# Patient Record
Sex: MALE | Race: WHITE | URBAN - METROPOLITAN AREA
[De-identification: names, ages, dates, MRNs, and addresses within clinical notes are randomized per-mention and may not be internally consistent; named-entity substitution may affect disease eponyms.]

---

## 2020-12-08 NOTE — PATIENT DISCUSSION
[place] grid at home. MVI/AREDS discussed. Patient to call if any changes in vision or grid card.
Cataract symptoms i.e., glare, blur discussed. Pt to call if worsening vision or trouble with driving, TV, reading, ADL. UV precautions. Reviewed possibility of future cataract surgery.
Discussed condition and exacerbating conditions/situations (e.g., dry/arid environments, overhead fans, air conditioners, side effect of medications).
Discussed lid hygiene, warm compress and eyelid wash.
Monitor cataract for progression.
Monitor.
No Retinal holes, Tears or Detachments.
Order fundus photo.
Patient understands condition, prognosis and need for follow up care.
Recommended artificial tears to use: 1 drop 2-4x a day in both eyes.
Retinal tear and detachment warning symptoms reviewed and patient instructed to call immediately if increasing floaters, flashes, or decreasing peripheral vision.
SRx, SV - DV,NV,IV.
unknown

## 2021-07-22 ENCOUNTER — NEW PATIENT COMPREHENSIVE (OUTPATIENT)
Dept: URBAN - METROPOLITAN AREA CLINIC 50 | Facility: CLINIC | Age: 71
End: 2021-07-22

## 2021-07-22 DIAGNOSIS — H43.813: ICD-10-CM

## 2021-07-22 DIAGNOSIS — H25.13: ICD-10-CM

## 2021-07-22 PROCEDURE — 92004 COMPRE OPH EXAM NEW PT 1/>: CPT

## 2021-07-22 PROCEDURE — 92015 DETERMINE REFRACTIVE STATE: CPT

## 2021-07-22 ASSESSMENT — VISUAL ACUITY
OS_CC: 20/30
OU_CC: J1+
OD_GLARE: 20/80
OD_SC: CF 4FT
OU_CC: 20/30
OD_GLARE: 20/40
OS_SC: 20/400
OD_CC: 20/30
OS_GLARE: 20/100
OS_GLARE: 20/60

## 2021-07-22 ASSESSMENT — KERATOMETRY
OS_AXISANGLE_DEGREES: 107
OS_K2POWER_DIOPTERS: 43.00
OD_K1POWER_DIOPTERS: 42.50
OS_AXISANGLE2_DEGREES: 17
OS_K1POWER_DIOPTERS: 42.25
OD_AXISANGLE2_DEGREES: 165
OD_AXISANGLE_DEGREES: 075
OD_K2POWER_DIOPTERS: 43.25

## 2021-07-22 ASSESSMENT — TONOMETRY
OS_IOP_MMHG: 13
OD_IOP_MMHG: 12

## 2021-08-05 ENCOUNTER — BIOMETRY (OUTPATIENT)
Dept: URBAN - METROPOLITAN AREA CLINIC 50 | Facility: CLINIC | Age: 71
End: 2021-08-05

## 2021-08-05 DIAGNOSIS — H25.13: ICD-10-CM

## 2021-08-05 PROCEDURE — TOPOIOL CORNEAL TOPOGRAPHY-PREMIUM IOL

## 2021-08-05 PROCEDURE — 92136 OPHTHALMIC BIOMETRY: CPT

## 2021-08-05 ASSESSMENT — KERATOMETRY
OS_K1POWER_DIOPTERS: 42.75
OS_AXISANGLE2_DEGREES: 098
OD_K2POWER_DIOPTERS: 41.87
OD_AXISANGLE2_DEGREES: 085
OD_AXISANGLE_DEGREES: 175
OS_AXISANGLE_DEGREES: 8
OD_K1POWER_DIOPTERS: 43.12
OS_K2POWER_DIOPTERS: 42.00

## 2021-08-25 ENCOUNTER — PRE OP - CE/IOL OS (OUTPATIENT)
Dept: URBAN - METROPOLITAN AREA CLINIC 50 | Facility: CLINIC | Age: 71
End: 2021-08-25

## 2021-08-25 DIAGNOSIS — H16.223: ICD-10-CM

## 2021-08-25 DIAGNOSIS — H43.811: ICD-10-CM

## 2021-08-25 DIAGNOSIS — H25.11: ICD-10-CM

## 2021-08-25 DIAGNOSIS — H25.12: ICD-10-CM

## 2021-08-25 PROCEDURE — PREOP PRE OP VISIT

## 2021-08-25 PROCEDURE — 92134 CPTRZ OPH DX IMG PST SGM RTA: CPT

## 2021-08-25 ASSESSMENT — VISUAL ACUITY
OS_CC: 20/40
OD_GLARE: 20/80
OS_GLARE: 20/100
OS_GLARE: 20/60
OD_CC: 20/25
OD_GLARE: 20/40

## 2021-08-25 ASSESSMENT — KERATOMETRY
OD_K2POWER_DIOPTERS: 41.87
OD_K1POWER_DIOPTERS: 43.12
OD_AXISANGLE2_DEGREES: 085
OS_K1POWER_DIOPTERS: 42.75
OD_AXISANGLE_DEGREES: 175
OS_AXISANGLE2_DEGREES: 098
OS_K2POWER_DIOPTERS: 42.00
OS_AXISANGLE_DEGREES: 8

## 2021-08-25 ASSESSMENT — TONOMETRY
OD_IOP_MMHG: 14
OS_IOP_MMHG: 15

## 2021-08-25 NOTE — PATIENT DISCUSSION
CATARACT SURGERY PLANNER - MULTIFOCAL IOL/+FEMTO: Phacoemulsification with IOL: Eye: LEFT|DOS: 9/14/21|Model: [id number]|Power: +13.00|Target: PLANO|Femto: YES|Arcs: - @ 5 ; -@ 185|Axis: 5|Visc: DUET|Omidria: YES|10% Phenylephrine: YES|Epi-shugarcaine: YES|Phaco Setting: DENSE|BSS+: NO|Trypan Blue: NO|CTR: NO|Olive Tip: NO|Atropine: NO|Pupilloplasty: NO|Notes: REQUEST MID MORNING. DENSE. AXIS 5. PLAN ORA.

## 2021-09-14 ENCOUNTER — SURGERY/PROCEDURE (OUTPATIENT)
Dept: URBAN - METROPOLITAN AREA SURGERY 16 | Facility: SURGERY | Age: 71
End: 2021-09-14

## 2021-09-14 ENCOUNTER — SAME DAY PO - CE/IOL (OUTPATIENT)
Dept: URBAN - METROPOLITAN AREA CLINIC 49 | Facility: CLINIC | Age: 71
End: 2021-09-14

## 2021-09-14 DIAGNOSIS — H25.12: ICD-10-CM

## 2021-09-14 DIAGNOSIS — Z98.42: ICD-10-CM

## 2021-09-14 PROCEDURE — 99024 POSTOP FOLLOW-UP VISIT: CPT

## 2021-09-14 PROCEDURE — DFWXXFEMTO SYNERGY TORIC IOL WITH FEMTO

## 2021-09-14 PROCEDURE — 66984 XCAPSL CTRC RMVL W/O ECP: CPT

## 2021-09-14 ASSESSMENT — KERATOMETRY
OS_AXISANGLE_DEGREES: 8
OD_AXISANGLE2_DEGREES: 085
OD_K2POWER_DIOPTERS: 41.87
OD_K1POWER_DIOPTERS: 43.12
OD_K1POWER_DIOPTERS: 43.12
OD_K2POWER_DIOPTERS: 41.87
OS_K1POWER_DIOPTERS: 42.75
OD_AXISANGLE_DEGREES: 175
OD_AXISANGLE2_DEGREES: 085
OS_K1POWER_DIOPTERS: 42.75
OS_AXISANGLE2_DEGREES: 098
OS_AXISANGLE2_DEGREES: 098
OS_AXISANGLE_DEGREES: 8
OS_K2POWER_DIOPTERS: 42.00
OS_K2POWER_DIOPTERS: 42.00
OD_AXISANGLE_DEGREES: 175

## 2021-09-14 ASSESSMENT — TONOMETRY: OS_IOP_MMHG: 20

## 2021-09-14 ASSESSMENT — VISUAL ACUITY: OS_SC: 20/60-1

## 2021-09-20 ENCOUNTER — PRE OP - CE/IOL OD / 1 WEEK PO OS (OUTPATIENT)
Dept: URBAN - METROPOLITAN AREA CLINIC 49 | Facility: CLINIC | Age: 71
End: 2021-09-20

## 2021-09-20 DIAGNOSIS — Z96.1: ICD-10-CM

## 2021-09-20 DIAGNOSIS — H25.11: ICD-10-CM

## 2021-09-20 PROCEDURE — PREOP PRE OP VISIT

## 2021-09-20 ASSESSMENT — TONOMETRY
OS_IOP_MMHG: 15
OD_IOP_MMHG: 13

## 2021-09-20 ASSESSMENT — VISUAL ACUITY
OS_PH: 20/25
OD_CC: 20/30
OS_SC: J2@17"
OS_SC: 20/30
OS_SC: J2@17"

## 2021-09-20 NOTE — PATIENT DISCUSSION
CATARACT SURGERY PLANNER - TORIC IOL/+FEMTO: Phacoemulsification with IOL: Eye: OD|DOS: 09/28/2021|Model: [id number]|Power: +12.00|Target: PLANO|Femto: YES|Arcs: - @ 175 ; - @ 355|Axis: 175|Visc: DUET|Omidria: YES|10% Phenylephrine: YES|Epi-shugarcaine: YES|Phaco Setting: STANDARD|BSS+: NO|Trypan Blue: NO|CTR: NO|Olive Tip: NO|Atropine: NO|Pupilloplasty: NO|Notes: AXIS 175/ IV- ANTECUB.

## 2021-09-28 ENCOUNTER — SURGERY/PROCEDURE (OUTPATIENT)
Dept: URBAN - METROPOLITAN AREA SURGERY 16 | Facility: SURGERY | Age: 71
End: 2021-09-28

## 2021-09-28 ENCOUNTER — SAME DAY PO - CE/IOL (OUTPATIENT)
Dept: URBAN - METROPOLITAN AREA CLINIC 49 | Facility: CLINIC | Age: 71
End: 2021-09-28

## 2021-09-28 DIAGNOSIS — H25.11: ICD-10-CM

## 2021-09-28 DIAGNOSIS — Z98.41: ICD-10-CM

## 2021-09-28 DIAGNOSIS — Z96.1: ICD-10-CM

## 2021-09-28 PROCEDURE — 66984 XCAPSL CTRC RMVL W/O ECP: CPT

## 2021-09-28 PROCEDURE — DFWXXFEMTO SYNERGY TORIC IOL WITH FEMTO

## 2021-09-28 PROCEDURE — 99024 POSTOP FOLLOW-UP VISIT: CPT

## 2021-09-28 ASSESSMENT — TONOMETRY: OD_IOP_MMHG: 26

## 2021-09-28 ASSESSMENT — VISUAL ACUITY: OD_SC: 20/40

## 2021-10-06 ENCOUNTER — 1 WEEK PO - CE/IOL (OUTPATIENT)
Dept: URBAN - METROPOLITAN AREA CLINIC 50 | Facility: CLINIC | Age: 71
End: 2021-10-06

## 2021-10-06 DIAGNOSIS — Z96.1: ICD-10-CM

## 2021-10-06 DIAGNOSIS — Z98.41: ICD-10-CM

## 2021-10-06 PROCEDURE — 99024 POSTOP FOLLOW-UP VISIT: CPT

## 2021-10-06 RX ORDER — LIFITEGRAST 50 MG/ML: 1 SOLUTION/ DROPS OPHTHALMIC TWICE A DAY

## 2021-10-06 ASSESSMENT — VISUAL ACUITY
OS_SC: 20/30
OS_SC: 20/25
OD_SC: 20/20
OD_SC: J1+
OS_SC: J3-
OD_SC: 20/20

## 2021-10-06 ASSESSMENT — TONOMETRY
OD_IOP_MMHG: 13
OS_IOP_MMHG: 14

## 2021-10-06 NOTE — PATIENT DISCUSSION
Moderate to severe dryness noted on today's exam. Will begin Xiidra BID OU(Samples given). Advised patient to continue Thera Tears 3-4 times a day. Will re-asses at 4 week post-op.

## 2021-10-27 ENCOUNTER — 4 WEEK POST-OP (OUTPATIENT)
Dept: URBAN - METROPOLITAN AREA CLINIC 50 | Facility: CLINIC | Age: 71
End: 2021-10-27

## 2021-10-27 DIAGNOSIS — H26.492: ICD-10-CM

## 2021-10-27 DIAGNOSIS — Z96.1: ICD-10-CM

## 2021-10-27 DIAGNOSIS — H16.223: ICD-10-CM

## 2021-10-27 PROCEDURE — 99024 POSTOP FOLLOW-UP VISIT: CPT

## 2021-10-27 ASSESSMENT — VISUAL ACUITY
OS_SC: J3
OU_GLARE: 20/30
OD_SC: 20/20
OS_SC: 20/25
OD_SC: 20/15-1
OU_GLARE: 20/60
OD_SC: J1+
OS_SC: 20/30
OS_PH: 20/25
OU_SC: 20/15

## 2021-10-27 ASSESSMENT — TONOMETRY
OS_IOP_MMHG: 13
OD_IOP_MMHG: 13

## 2022-01-27 ENCOUNTER — COMPREHENSIVE EXAM (OUTPATIENT)
Dept: URBAN - METROPOLITAN AREA CLINIC 50 | Facility: CLINIC | Age: 72
End: 2022-01-27

## 2022-01-27 DIAGNOSIS — H43.811: ICD-10-CM

## 2022-01-27 DIAGNOSIS — H26.492: ICD-10-CM

## 2022-01-27 DIAGNOSIS — H16.223: ICD-10-CM

## 2022-01-27 PROCEDURE — 92015 DETERMINE REFRACTIVE STATE: CPT

## 2022-01-27 PROCEDURE — 92014 COMPRE OPH EXAM EST PT 1/>: CPT

## 2022-01-27 ASSESSMENT — VISUAL ACUITY
OU_SC: 20/30
OD_SC: 20/30
OS_PH: 20/30
OS_SC: 20/40
OD_GLARE: >20/400
OU_SC: J1+ @16"
OS_GLARE: >20/400

## 2022-01-27 ASSESSMENT — TONOMETRY
OD_IOP_MMHG: 14
OS_IOP_MMHG: 14

## 2022-01-27 NOTE — PATIENT DISCUSSION
Patient to continue Glenna Lizeth 1gtt BID, OU. Patient asked about continuing Ocu Soft Lid Scrubs, and advised patient he may continue lid scrubs.

## 2022-01-27 NOTE — PATIENT DISCUSSION
Final Glasses Rx given to patient. Normal vision: sees adequately in most situations; can see medication labels, newsprint

## 2023-01-30 ENCOUNTER — COMPREHENSIVE EXAM (OUTPATIENT)
Dept: URBAN - METROPOLITAN AREA CLINIC 53 | Facility: CLINIC | Age: 73
End: 2023-01-30

## 2023-01-30 DIAGNOSIS — H26.492: ICD-10-CM

## 2023-01-30 DIAGNOSIS — H43.813: ICD-10-CM

## 2023-01-30 DIAGNOSIS — H16.223: ICD-10-CM

## 2023-01-30 DIAGNOSIS — H10.13: ICD-10-CM

## 2023-01-30 PROCEDURE — 92014 COMPRE OPH EXAM EST PT 1/>: CPT

## 2023-01-30 RX ORDER — LOTEPREDNOL ETABONATE 5 MG/ML: 1 SUSPENSION/ DROPS OPHTHALMIC TWICE A DAY

## 2023-01-30 ASSESSMENT — VISUAL ACUITY
OD_SC: 20/30
OS_GLARE: 20/30
OU_CC: J1 @ 18"
OS_GLARE: 20/50
OD_GLARE: 20/40
OD_PH: 20/25
OD_GLARE: 20/25
OS_SC: 20/30

## 2023-01-30 ASSESSMENT — TONOMETRY
OD_IOP_MMHG: 15
OS_IOP_MMHG: 14

## 2023-01-30 NOTE — PATIENT DISCUSSION
Continue PF artificial tears 2-3x a day OU. Continue Lid scrubs 2-3x a day ou. Start warm compresses 1-2x a day ou. Start Loteprednol BID OU for 2 weeks then Qday ou for 2 weeks. RTC in 3-4 weeks for follow up.

## 2023-05-16 ENCOUNTER — APPOINTMENT (RX ONLY)
Dept: URBAN - METROPOLITAN AREA CLINIC 86 | Facility: CLINIC | Age: 73
Setting detail: DERMATOLOGY
End: 2023-05-16

## 2023-05-16 DIAGNOSIS — L92.3 FOREIGN BODY GRANULOMA OF THE SKIN AND SUBCUTANEOUS TISSUE: ICD-10-CM

## 2023-05-16 PROCEDURE — ? FOREIGN BODY REMOVAL

## 2023-05-16 PROCEDURE — 10120 INC&RMVL FB SUBQ TISS SMPL: CPT

## 2023-05-16 ASSESSMENT — LOCATION ZONE DERM: LOCATION ZONE: FINGER

## 2023-05-16 ASSESSMENT — LOCATION DETAILED DESCRIPTION DERM: LOCATION DETAILED: LEFT DISTAL DORSAL THUMB

## 2023-05-16 ASSESSMENT — LOCATION SIMPLE DESCRIPTION DERM: LOCATION SIMPLE: LEFT THUMB

## 2024-01-04 NOTE — PATIENT DISCUSSION
Procedure End   VISUALLY SIGNIFICANT: Informed patient that their cataract is visually significant and that surgery is recommended to improve patient's visual acuity and/or glare symptoms. RBAs of surgery discussed and questions answered. Patient to schedule biometry measurements and surgery.

## 2024-01-22 ENCOUNTER — COMPREHENSIVE EXAM (OUTPATIENT)
Dept: URBAN - METROPOLITAN AREA CLINIC 53 | Facility: CLINIC | Age: 74
End: 2024-01-22

## 2024-01-22 DIAGNOSIS — H26.492: ICD-10-CM

## 2024-01-22 DIAGNOSIS — H43.813: ICD-10-CM

## 2024-01-22 DIAGNOSIS — H10.13: ICD-10-CM

## 2024-01-22 DIAGNOSIS — H52.4: ICD-10-CM

## 2024-01-22 DIAGNOSIS — H16.223: ICD-10-CM

## 2024-01-22 PROCEDURE — 92015 DETERMINE REFRACTIVE STATE: CPT

## 2024-01-22 PROCEDURE — 99214 OFFICE O/P EST MOD 30 MIN: CPT

## 2024-01-22 RX ORDER — LIFITEGRAST 50 MG/ML: 1 SOLUTION/ DROPS OPHTHALMIC TWICE A DAY

## 2024-01-22 ASSESSMENT — KERATOMETRY
OS_K1POWER_DIOPTERS: 42.50
OD_AXISANGLE_DEGREES: 75
OD_AXISANGLE2_DEGREES: 165
OD_K1POWER_DIOPTERS: 42.00
OS_AXISANGLE2_DEGREES: 5
OD_K2POWER_DIOPTERS: 42.75
OS_K2POWER_DIOPTERS: 43.00
OS_AXISANGLE_DEGREES: 95

## 2024-01-22 ASSESSMENT — VISUAL ACUITY
OD_SC: 20/30
OD_PH: 20/25-2
OS_GLARE: 20/20
OS_SC: 20/50
OS_GLARE: 20/20
OU_SC: J1+@16
OD_GLARE: 20/20
OD_GLARE: 20/40

## 2024-01-22 ASSESSMENT — TONOMETRY
OD_IOP_MMHG: 17
OS_IOP_MMHG: 17

## 2025-01-23 ENCOUNTER — COMPREHENSIVE EXAM (OUTPATIENT)
Age: 75
End: 2025-01-23

## 2025-01-23 DIAGNOSIS — H16.223: ICD-10-CM

## 2025-01-23 DIAGNOSIS — H26.493: ICD-10-CM

## 2025-01-23 PROCEDURE — 99214 OFFICE O/P EST MOD 30 MIN: CPT

## 2025-02-05 ENCOUNTER — CONSULTATION/EVALUATION (OUTPATIENT)
Age: 75
End: 2025-02-05

## 2025-02-05 DIAGNOSIS — H26.493: ICD-10-CM

## 2025-02-05 PROCEDURE — 66821 AFTER CATARACT LASER SURGERY: CPT | Mod: 54

## 2025-02-12 ENCOUNTER — CLINIC PROCEDURE ONLY (OUTPATIENT)
Age: 75
End: 2025-02-12

## 2025-02-12 DIAGNOSIS — H26.492: ICD-10-CM

## 2025-02-12 PROCEDURE — 66821 AFTER CATARACT LASER SURGERY: CPT | Mod: 54,LT

## 2025-02-26 ENCOUNTER — POST-OP (OUTPATIENT)
Age: 75
End: 2025-02-26

## 2025-02-26 DIAGNOSIS — Z98.890: ICD-10-CM

## 2025-02-26 PROCEDURE — 92015 DETERMINE REFRACTIVE STATE: CPT | Mod: NC

## 2025-02-26 RX ORDER — CYCLOSPORINE 0 MG/ML
1 SOLUTION/ DROPS OPHTHALMIC; TOPICAL TWICE A DAY
Start: 2025-02-26

## 2025-02-26 RX ORDER — FLUOROMETHOLONE 1 MG/ML
1 SOLUTION/ DROPS OPHTHALMIC TWICE A DAY
Start: 2025-02-26

## 2025-04-23 ENCOUNTER — FOLLOW UP (OUTPATIENT)
Age: 75
End: 2025-04-23

## 2025-04-23 DIAGNOSIS — H43.813: ICD-10-CM

## 2025-04-23 DIAGNOSIS — H16.223: ICD-10-CM

## 2025-04-23 PROCEDURE — 99213 OFFICE O/P EST LOW 20 MIN: CPT
